# Patient Record
Sex: FEMALE | NOT HISPANIC OR LATINO | URBAN - METROPOLITAN AREA
[De-identification: names, ages, dates, MRNs, and addresses within clinical notes are randomized per-mention and may not be internally consistent; named-entity substitution may affect disease eponyms.]

---

## 2023-09-21 ENCOUNTER — HOSPITAL ENCOUNTER (OUTPATIENT)
Dept: RADIOLOGY | Facility: MEDICAL CENTER | Age: 43
End: 2023-09-21
Payer: COMMERCIAL

## 2023-11-14 ENCOUNTER — HOSPITAL ENCOUNTER (OUTPATIENT)
Dept: RADIOLOGY | Facility: MEDICAL CENTER | Age: 43
End: 2023-11-14
Attending: NURSE PRACTITIONER
Payer: COMMERCIAL

## 2023-11-14 DIAGNOSIS — Z12.31 VISIT FOR SCREENING MAMMOGRAM: ICD-10-CM

## 2023-11-14 DIAGNOSIS — N60.11 FIBROCYSTIC DISEASE OF RIGHT BREAST: ICD-10-CM

## 2023-11-14 PROCEDURE — 77063 BREAST TOMOSYNTHESIS BI: CPT

## 2023-11-14 PROCEDURE — 76641 ULTRASOUND BREAST COMPLETE: CPT

## 2024-03-05 ENCOUNTER — HOSPITAL ENCOUNTER (OUTPATIENT)
Dept: RADIOLOGY | Facility: MEDICAL CENTER | Age: 44
End: 2024-03-05
Attending: NURSE PRACTITIONER
Payer: COMMERCIAL

## 2024-03-05 DIAGNOSIS — R05.9 COUGH, UNSPECIFIED TYPE: ICD-10-CM

## 2024-03-05 PROCEDURE — 71046 X-RAY EXAM CHEST 2 VIEWS: CPT

## 2025-05-29 ENCOUNTER — OFFICE VISIT (OUTPATIENT)
Dept: URGENT CARE | Facility: PHYSICIAN GROUP | Age: 45
End: 2025-05-29
Payer: COMMERCIAL

## 2025-05-29 VITALS
BODY MASS INDEX: 29.68 KG/M2 | HEART RATE: 75 BPM | DIASTOLIC BLOOD PRESSURE: 80 MMHG | RESPIRATION RATE: 14 BRPM | HEIGHT: 63 IN | WEIGHT: 167.5 LBS | SYSTOLIC BLOOD PRESSURE: 118 MMHG | OXYGEN SATURATION: 98 % | TEMPERATURE: 96.9 F

## 2025-05-29 DIAGNOSIS — S39.012A STRAIN OF LUMBAR REGION, INITIAL ENCOUNTER: Primary | ICD-10-CM

## 2025-05-29 RX ORDER — CYCLOBENZAPRINE HCL 5 MG
5-10 TABLET ORAL NIGHTLY PRN
Qty: 20 TABLET | Refills: 0 | Status: SHIPPED | OUTPATIENT
Start: 2025-05-29

## 2025-05-29 RX ORDER — KETOROLAC TROMETHAMINE 15 MG/ML
15 INJECTION, SOLUTION INTRAMUSCULAR; INTRAVENOUS ONCE
Status: COMPLETED | OUTPATIENT
Start: 2025-05-29 | End: 2025-05-29

## 2025-05-29 RX ADMIN — KETOROLAC TROMETHAMINE 15 MG: 15 INJECTION, SOLUTION INTRAMUSCULAR; INTRAVENOUS at 17:46

## 2025-05-29 ASSESSMENT — ENCOUNTER SYMPTOMS
PARESTHESIAS: 0
NUMBNESS: 0
FEVER: 0
BOWEL INCONTINENCE: 0
PERIANAL NUMBNESS: 0
WEAKNESS: 0
BACK PAIN: 1
TINGLING: 0
LEG PAIN: 0

## 2025-05-30 NOTE — PROGRESS NOTES
"Rani Jorge is a 44 y.o. female who presents with Back Pain (Back just started hurting 5/27, lower back pain radiating to the side of both hips.)            Back Pain  This is a new problem. Episode onset: x 2 days ago. The problem occurs constantly. The problem is unchanged. The pain is present in the lumbar spine. The quality of the pain is described as aching. The pain does not radiate. The pain is moderate. The symptoms are aggravated by position, bending and lying down. Pertinent negatives include no bladder incontinence, bowel incontinence, dysuria, fever, leg pain, numbness, paresthesias, perianal numbness, tingling or weakness. She has tried NSAIDs for the symptoms.     The patient states she was attempting to pull on a pair pants when she twisted, tweaking her right lower back.  The patient reports no direct injury or trauma to her back.  No recent falls.    PMH:  has no past medical history on file.  MEDS: Current Medications[1]  ALLERGIES: Allergies[2]  SURGHX: Past Surgical History[3]  SOCHX:  reports that she has never smoked. She has never been exposed to tobacco smoke. She has never used smokeless tobacco. She reports that she does not drink alcohol and does not use drugs.  FH: Family history was reviewed, no pertinent findings to report    Review of Systems   Constitutional:  Negative for fever.   Gastrointestinal:  Negative for bowel incontinence.   Genitourinary:  Negative for bladder incontinence and dysuria.   Musculoskeletal:  Positive for back pain.   Neurological:  Negative for tingling, weakness, numbness and paresthesias.              Objective     /80 (BP Location: Right arm, Patient Position: Sitting, BP Cuff Size: Adult)   Pulse 75   Temp 36.1 °C (96.9 °F)   Resp 14   Ht 1.6 m (5' 3\")   Wt 76 kg (167 lb 8 oz)   SpO2 98%   BMI 29.67 kg/m²      Physical Exam  Constitutional:       General: She is not in acute distress.     Appearance: Normal appearance. She is " well-developed. She is not ill-appearing.   HENT:      Head: Normocephalic and atraumatic.      Right Ear: External ear normal.      Left Ear: External ear normal.      Nose: Nose normal.   Eyes:      Extraocular Movements: Extraocular movements intact.      Conjunctiva/sclera: Conjunctivae normal.   Cardiovascular:      Rate and Rhythm: Normal rate.   Pulmonary:      Effort: Pulmonary effort is normal.   Musculoskeletal:      Cervical back: Normal range of motion and neck supple.      Comments:   Lumbar Spine:  A localized area of tenderness is present to the right paraspinal region of the lumbar spine with palpable muscle tightness/spasm.  No midline/bony tenderness.  No palpable step-off.  No tenderness to the left paraspinal muscle region.  No edema.  No ecchymosis.  No overlying erythema.  No increased warmth.  No rashes/lesions.  Decreased ROM -the patient demonstrates limited range of motion secondary pain and soreness  Neurovascular intact distally  Strength 5/5 -equal bilateral lower extremities  Normal gait   Skin:     General: Skin is warm and dry.   Neurological:      Mental Status: She is alert and oriented to person, place, and time.                  Progress:  Toradol 15mg IM given in clinic.   The patient tolerated this medication without side effects. The patient was observed in clinic for 10 minutes following the Toradol injection.               Assessment & Plan  Strain of lumbar region, initial encounter    Orders:    ketorolac (Toradol) 15 MG/ML injection 15 mg    cyclobenzaprine (FLEXERIL) 5 MG tablet; Take 1-2 Tablets by mouth at bedtime as needed for Muscle Spasms.  --Advised the patient to only take this medication at night, as it may cause drowsiness. Instructed the patient not to take the medication while at work, driving, or operating machinery.      Differential diagnoses, supportive care, and indications for immediate follow-up discussed with patient.   Instructed to return to clinic or  nearest emergency department for any change in condition, further concerns, or worsening of symptoms.    OTC NSAIDs for pain/discomfort  Alternate ice and heat to the affected area for symptomatic relief  Home stretches as discussed in clinic  Follow-up with PCP  Return to clinic or go to the ED if symptoms worsen or fail to improve, or if the patient should develop worsening/increasing back pain, radiation of pain, numbness, tingling, or weakness to the lower extremities, incontinence of bladder/bowel, paresthesias, difficulty walking, fever/chills, and/or any concerning symptoms.     Discussed plan with the patient, and she agrees to the above.     I personally reviewed prior external notes and test results pertinent to today's visit.  I have independently reviewed and interpreted all diagnostics ordered during this urgent care visit.     Please note that this dictation was created using voice recognition software. I have made every reasonable attempt to correct obvious errors, but I expect that there may be errors of grammar and possibly content that I did not discover before finalizing the note.     This note was electronically signed by Richa Domínguez PA-C                      [1] No current outpatient medications on file.  [2] No Known Allergies  [3] History reviewed. No pertinent surgical history.

## 2025-07-01 ENCOUNTER — HOSPITAL ENCOUNTER (OUTPATIENT)
Dept: RADIOLOGY | Facility: MEDICAL CENTER | Age: 45
End: 2025-07-01
Attending: PHYSICIAN ASSISTANT
Payer: COMMERCIAL

## 2025-07-01 DIAGNOSIS — M54.31 SCIATICA OF RIGHT SIDE: ICD-10-CM

## 2025-07-01 PROCEDURE — 72100 X-RAY EXAM L-S SPINE 2/3 VWS: CPT
